# Patient Record
Sex: FEMALE | Race: WHITE | NOT HISPANIC OR LATINO | URBAN - METROPOLITAN AREA
[De-identification: names, ages, dates, MRNs, and addresses within clinical notes are randomized per-mention and may not be internally consistent; named-entity substitution may affect disease eponyms.]

---

## 2018-07-19 ENCOUNTER — OFFICE VISIT (OUTPATIENT)
Dept: URBAN - METROPOLITAN AREA CLINIC 29 | Facility: CLINIC | Age: 83
End: 2018-07-19
Payer: MEDICARE

## 2018-07-19 DIAGNOSIS — H40.1433 BILATERAL CAPSULAR GLAUCOMA W/ PSEUDOEXFOLIATION OF LENS, SEVERE STAGE: Primary | ICD-10-CM

## 2018-07-19 PROCEDURE — 99213 OFFICE O/P EST LOW 20 MIN: CPT | Performed by: OPHTHALMOLOGY

## 2018-07-19 ASSESSMENT — INTRAOCULAR PRESSURE
OD: 18
OS: 16

## 2018-07-19 NOTE — IMPRESSION/PLAN
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 OU. Trab OU ONH stable OU
IOP elevated OU Plan: Discussed diagnosis, explained and understood by patient. Discussed IOP/ONH/Glaucoma management and risks. Pt has only been using drops once a day. Stressed compliance with gtts. Continue lumigan ou qhs, combigan bid ou, azopt tid ou. Will continue to monitor pressure. refraction with Dr. Abdulaziz Castillo or Dr. Leslie Doan.

## 2018-10-23 ENCOUNTER — OFFICE VISIT (OUTPATIENT)
Dept: URBAN - METROPOLITAN AREA CLINIC 29 | Facility: CLINIC | Age: 83
End: 2018-10-23
Payer: MEDICARE

## 2018-10-23 PROCEDURE — 99213 OFFICE O/P EST LOW 20 MIN: CPT | Performed by: OPHTHALMOLOGY

## 2018-10-23 ASSESSMENT — INTRAOCULAR PRESSURE
OS: 13
OD: 13

## 2018-10-23 NOTE — IMPRESSION/PLAN
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 OU. Trabeculectomy OU--ONH stable OU--IOP doing well ou - Plan: Discussed diagnosis, explained and understood by patient. Discussed IOP/ONH/Glaucoma management and risks. Stressed compliance with gtts. Continue lumigan ou qhs, combigan bid ou, azopt tid ou. Will continue to monitor condition and symptoms.

## 2019-03-07 ENCOUNTER — OFFICE VISIT (OUTPATIENT)
Dept: URBAN - METROPOLITAN AREA CLINIC 23 | Facility: CLINIC | Age: 84
End: 2019-03-07
Payer: MEDICARE

## 2019-03-07 PROCEDURE — 99214 OFFICE O/P EST MOD 30 MIN: CPT | Performed by: OPHTHALMOLOGY

## 2019-03-07 PROCEDURE — 92083 EXTENDED VISUAL FIELD XM: CPT | Performed by: OPHTHALMOLOGY

## 2019-03-07 ASSESSMENT — INTRAOCULAR PRESSURE
OD: 12
OS: 12

## 2019-03-07 NOTE — IMPRESSION/PLAN
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 OU. Trabeculectomy OU--ONH stable OU--IOP doing well ou - Plan: Discussed diagnosis, explained and understood by patient. Discussed IOP/ONH/Glaucoma management and risks. Stressed compliance with gtts. visual field ordered and reviewed with pt. Continue lumigan ou qhs, combigan bid ou, azopt tid ou. Will continue to monitor condition and symptoms.